# Patient Record
Sex: FEMALE | Race: OTHER
[De-identification: names, ages, dates, MRNs, and addresses within clinical notes are randomized per-mention and may not be internally consistent; named-entity substitution may affect disease eponyms.]

---

## 2018-02-22 ENCOUNTER — RESULT REVIEW (OUTPATIENT)
Age: 28
End: 2018-02-22

## 2018-04-12 ENCOUNTER — RESULT REVIEW (OUTPATIENT)
Age: 28
End: 2018-04-12

## 2018-10-11 ENCOUNTER — TRANSCRIPTION ENCOUNTER (OUTPATIENT)
Age: 28
End: 2018-10-11

## 2019-02-12 PROBLEM — Z00.00 ENCOUNTER FOR PREVENTIVE HEALTH EXAMINATION: Status: ACTIVE | Noted: 2019-02-12

## 2019-02-13 ENCOUNTER — RX RENEWAL (OUTPATIENT)
Age: 29
End: 2019-02-13

## 2019-02-13 DIAGNOSIS — Z30.9 ENCOUNTER FOR CONTRACEPTIVE MANAGEMENT, UNSPECIFIED: ICD-10-CM

## 2019-02-19 ENCOUNTER — RECORD ABSTRACTING (OUTPATIENT)
Age: 29
End: 2019-02-19

## 2019-02-19 DIAGNOSIS — Z82.49 FAMILY HISTORY OF ISCHEMIC HEART DISEASE AND OTHER DISEASES OF THE CIRCULATORY SYSTEM: ICD-10-CM

## 2019-02-19 DIAGNOSIS — G44.89 OTHER HEADACHE SYNDROME: ICD-10-CM

## 2019-02-19 DIAGNOSIS — Z78.9 OTHER SPECIFIED HEALTH STATUS: ICD-10-CM

## 2019-02-19 DIAGNOSIS — Z83.3 FAMILY HISTORY OF DIABETES MELLITUS: ICD-10-CM

## 2019-02-19 LAB — CYTOLOGY CVX/VAG DOC THIN PREP: NORMAL

## 2019-03-04 ENCOUNTER — APPOINTMENT (OUTPATIENT)
Dept: OBGYN | Facility: CLINIC | Age: 29
End: 2019-03-04

## 2019-03-13 ENCOUNTER — APPOINTMENT (OUTPATIENT)
Dept: OBGYN | Facility: CLINIC | Age: 29
End: 2019-03-13
Payer: COMMERCIAL

## 2019-03-13 VITALS
BODY MASS INDEX: 43.95 KG/M2 | HEIGHT: 68 IN | SYSTOLIC BLOOD PRESSURE: 122 MMHG | WEIGHT: 290 LBS | DIASTOLIC BLOOD PRESSURE: 76 MMHG

## 2019-03-13 LAB
BILIRUB UR QL STRIP: NORMAL
COLLECTION METHOD: NORMAL
GLUCOSE UR-MCNC: NORMAL
HCG UR QL: 0.2 EU/DL
HCG UR QL: NEGATIVE
HGB UR QL STRIP.AUTO: NORMAL
KETONES UR-MCNC: NORMAL
LEUKOCYTE ESTERASE UR QL STRIP: NORMAL
NITRITE UR QL STRIP: NORMAL
PH UR STRIP: 8.5
PROT UR STRIP-MCNC: ABNORMAL
QUALITY CONTROL: YES
SP GR UR STRIP: 1.02

## 2019-03-13 PROCEDURE — 81003 URINALYSIS AUTO W/O SCOPE: CPT | Mod: QW

## 2019-03-13 PROCEDURE — 99395 PREV VISIT EST AGE 18-39: CPT

## 2019-03-13 PROCEDURE — 81025 URINE PREGNANCY TEST: CPT

## 2019-03-13 NOTE — HISTORY OF PRESENT ILLNESS
[Last Pap ___] : Last cervical pap smear was [unfilled] [Sexually Active] : is sexually active [Monogamous] : is monogamous [Contraception] : uses contraception [Oral Contraceptives] : uses oral contraceptives [Menarche Age: ____] : age at menarche was [unfilled] [Definite:  ___ (Date)] : the last menstrual period was [unfilled] [Male ___] : [unfilled] male

## 2019-03-13 NOTE — COUNSELING
[Breast Self Exam] : breast self exam [Nutrition] : nutrition [Exercise] : exercise [Vitamins/Supplements] : vitamins/supplements [Contraception] : contraception [Sunscreen] : sunscreen [Safe Sexual Practices] : safe sexual practices [Weight Management] : weight management

## 2019-03-16 LAB
C TRACH RRNA SPEC QL NAA+PROBE: NOT DETECTED
N GONORRHOEA RRNA SPEC QL NAA+PROBE: NOT DETECTED
SOURCE TP AMPLIFICATION: NORMAL

## 2019-03-18 LAB — CYTOLOGY CVX/VAG DOC THIN PREP: NORMAL

## 2019-11-07 ENCOUNTER — TRANSCRIPTION ENCOUNTER (OUTPATIENT)
Age: 29
End: 2019-11-07

## 2020-02-05 ENCOUNTER — RX RENEWAL (OUTPATIENT)
Age: 30
End: 2020-02-05

## 2020-05-04 ENCOUNTER — RX RENEWAL (OUTPATIENT)
Age: 30
End: 2020-05-04

## 2020-10-28 ENCOUNTER — APPOINTMENT (OUTPATIENT)
Dept: OBGYN | Facility: CLINIC | Age: 30
End: 2020-10-28
Payer: COMMERCIAL

## 2020-10-28 VITALS
WEIGHT: 220 LBS | HEIGHT: 68 IN | DIASTOLIC BLOOD PRESSURE: 82 MMHG | BODY MASS INDEX: 33.34 KG/M2 | SYSTOLIC BLOOD PRESSURE: 124 MMHG

## 2020-10-28 DIAGNOSIS — Z85.850 PERSONAL HISTORY OF MALIGNANT NEOPLASM OF THYROID: ICD-10-CM

## 2020-10-28 DIAGNOSIS — Z86.39 PERSONAL HISTORY OF OTHER ENDOCRINE, NUTRITIONAL AND METABOLIC DISEASE: ICD-10-CM

## 2020-10-28 DIAGNOSIS — Z98.84 BARIATRIC SURGERY STATUS: ICD-10-CM

## 2020-10-28 DIAGNOSIS — Z01.419 ENCOUNTER FOR GYNECOLOGICAL EXAMINATION (GENERAL) (ROUTINE) W/OUT ABNORMAL FINDINGS: ICD-10-CM

## 2020-10-28 PROCEDURE — 99395 PREV VISIT EST AGE 18-39: CPT

## 2020-10-28 PROCEDURE — 99072 ADDL SUPL MATRL&STAF TM PHE: CPT

## 2020-10-28 RX ORDER — NORETHINDRONE ACETATE AND ETHINYL ESTRADIOL AND FERROUS FUMARATE 1.5-30(21)
1.5-3 KIT ORAL
Qty: 1 | Refills: 0 | Status: DISCONTINUED | COMMUNITY
Start: 2019-02-13 | End: 2020-10-28

## 2020-10-28 RX ORDER — NORETHINDRONE ACETATE AND ETHINYL ESTRADIOL 1MG-20(21)
1-20 KIT ORAL DAILY
Qty: 84 | Refills: 3 | Status: DISCONTINUED | COMMUNITY
Start: 2019-03-13 | End: 2020-10-28

## 2020-10-28 RX ORDER — NORETHINDRONE ACETATE AND ETHINYL ESTRADIOL AND FERROUS FUMARATE 1MG-20(21)
1-20 KIT ORAL DAILY
Qty: 1 | Refills: 0 | Status: DISCONTINUED | COMMUNITY
Start: 2020-02-05 | End: 2020-10-28

## 2020-10-28 RX ORDER — AMOXICILLIN AND CLAVULANATE POTASSIUM 875; 125 MG/1; MG/1
875-125 TABLET, COATED ORAL
Qty: 20 | Refills: 0 | Status: DISCONTINUED | COMMUNITY
Start: 2019-03-04 | End: 2020-10-28

## 2020-11-07 PROBLEM — Z86.39 HISTORY OF HASHIMOTO THYROIDITIS: Status: RESOLVED | Noted: 2020-11-07 | Resolved: 2020-11-07

## 2020-11-07 PROBLEM — Z98.84 H/O BARIATRIC SURGERY: Status: RESOLVED | Noted: 2020-11-07 | Resolved: 2020-11-07

## 2020-11-07 PROBLEM — Z85.850 HISTORY OF MALIGNANT NEOPLASM OF THYROID: Status: RESOLVED | Noted: 2020-11-07 | Resolved: 2020-11-07

## 2020-11-07 RX ORDER — LEVOTHYROXINE SODIUM 0.14 MG/1
137 TABLET ORAL
Refills: 0 | Status: ACTIVE | COMMUNITY

## 2020-11-07 NOTE — COUNSELING
[Nutrition/ Exercise/ Weight Management] : nutrition, exercise, weight management [Vitamins/Supplements] : vitamins/supplements [Sunscreen] : sunscreen [Breast Self Exam] : breast self exam [Contraception/ Emergency Contraception/ Safe Sexual Practices] : contraception, emergency contraception, safe sexual practices

## 2020-11-07 NOTE — HISTORY OF PRESENT ILLNESS
[Patient reported PAP Smear was normal] : Patient reported PAP Smear was normal [Menarche Age: ____] : age at menarche was [unfilled] [Men] : men [N] : Patient is not sexually active [Previously active] : previously active [TextBox_4] : Patient presents for annual gynecological exam\par \par Patient is status post gastric surgery 2019 with Dr. Patel and has lost 70 pounds and doing well\par \par She is status post thyroidectomy for thyroid cancer found in February 2020 and still being closely followed with TSH and thyroid levels\par \par She stopped oral contraceptives and has not restarted-she is not sexually active\par \par  [PapSmeardate] : 03/12/2019 [LMPDate] : 10/05/202 [TextBox_6] : 10/05/2020 [TextBox_9] : 12 [FreeTextEntry1] : 10/05/2020

## 2020-11-07 NOTE — PLAN
[FreeTextEntry1] : Patient prefers to stay off oral contraceptives at this time as she is under the care and closely followed for thyroid cancer and not presently sexually active

## 2020-11-24 LAB
C TRACH RRNA SPEC QL NAA+PROBE: NOT DETECTED
CYTOLOGY CVX/VAG DOC THIN PREP: NORMAL
N GONORRHOEA RRNA SPEC QL NAA+PROBE: NOT DETECTED
SOURCE TP AMPLIFICATION: NORMAL

## 2020-12-23 PROBLEM — Z01.419 ENCOUNTER FOR GYNECOLOGICAL EXAMINATION WITHOUT ABNORMAL FINDING: Status: RESOLVED | Noted: 2019-03-13 | Resolved: 2020-12-23

## 2021-01-04 ENCOUNTER — TRANSCRIPTION ENCOUNTER (OUTPATIENT)
Age: 31
End: 2021-01-04

## 2021-01-11 ENCOUNTER — APPOINTMENT (OUTPATIENT)
Dept: OBGYN | Facility: CLINIC | Age: 31
End: 2021-01-11
Payer: COMMERCIAL

## 2021-01-11 VITALS
DIASTOLIC BLOOD PRESSURE: 70 MMHG | HEIGHT: 67 IN | SYSTOLIC BLOOD PRESSURE: 112 MMHG | WEIGHT: 215 LBS | BODY MASS INDEX: 33.74 KG/M2 | TEMPERATURE: 97.2 F

## 2021-01-11 DIAGNOSIS — N90.7 VULVAR CYST: ICD-10-CM

## 2021-01-11 PROCEDURE — 99072 ADDL SUPL MATRL&STAF TM PHE: CPT

## 2021-01-11 PROCEDURE — 99213 OFFICE O/P EST LOW 20 MIN: CPT

## 2021-01-11 NOTE — HISTORY OF PRESENT ILLNESS
[N] : Patient denies prior pregnancies [Menarche Age: ____] : age at menarche was [unfilled] [Previously active] : previously active [No] : No [TextBox_4] : 31yo pt presents for possible in grown hair after waxing\par \par Pt denies pain or fever \par \par Pt is s/p bariatric sx with Dr. Patel\par \par Pt is s/p thyroidectomy for thyroid cancer found on w/u for bariatric sx\par \par She has a hx of keloid scarring- she tried inj of steroid in scar with derm but was too painful [PapSmeardate] : 10/28/2020  [ChlamydiaDate] : 10/28/2020  [TextBox_31] : WNL  [TextBox_68] : NEG  [LMPDate] : 12/16/2020  [TextBox_6] : 12/16/2020  [TextBox_9] : 13 [FreeTextEntry1] : 12/16/2020

## 2021-01-11 NOTE — PHYSICAL EXAM
[Appropriately responsive] : appropriately responsive [Alert] : alert [No Acute Distress] : no acute distress [FreeTextEntry1] : resolving 5mm sebaceous cyst on mons- no erythema no tenderness no heat no induration no sxs infection

## 2021-04-26 ENCOUNTER — APPOINTMENT (OUTPATIENT)
Dept: OBGYN | Facility: CLINIC | Age: 31
End: 2021-04-26

## 2023-02-10 ENCOUNTER — APPOINTMENT (OUTPATIENT)
Dept: ORTHOPEDIC SURGERY | Facility: CLINIC | Age: 33
End: 2023-02-10

## 2023-02-17 ENCOUNTER — APPOINTMENT (OUTPATIENT)
Dept: ORTHOPEDIC SURGERY | Facility: CLINIC | Age: 33
End: 2023-02-17
Payer: COMMERCIAL

## 2023-02-17 VITALS — BODY MASS INDEX: 34.86 KG/M2 | WEIGHT: 230 LBS | HEIGHT: 68 IN

## 2023-02-17 DIAGNOSIS — M76.51 PATELLAR TENDINITIS, RIGHT KNEE: ICD-10-CM

## 2023-02-17 DIAGNOSIS — M76.891 OTHER SPECIFIED ENTHESOPATHIES OF RIGHT LOWER LIMB, EXCLUDING FOOT: ICD-10-CM

## 2023-02-17 PROCEDURE — 73562 X-RAY EXAM OF KNEE 3: CPT | Mod: RT

## 2023-02-17 PROCEDURE — 99203 OFFICE O/P NEW LOW 30 MIN: CPT

## 2023-02-17 NOTE — IMAGING
[de-identified] : Right knee with minimal swelling, mild ttp anteriorly. Able to straight leg raise. Stable to varus/valgus stress. +TA, EHL, GA. SILT throughout. <2sec cap refill.\par \par Right knee radiographs with no fracture nor dislocation. Enthesophyte at quad and patellar tendon insertion at patella.

## 2023-02-17 NOTE — ASSESSMENT
[FreeTextEntry1] : Right knee quad/patellar tendonitis - reviewed radiographs and pathoanatomy with patient. Discussed management to consist of NSAIDs prn, PT, activity modification.\par \par F/u prn

## 2023-02-17 NOTE — HISTORY OF PRESENT ILLNESS
[de-identified] : 33F, PMHX of Thyroid Disease presents with right knee pain for approx 2 months. Reports noticed when getting up off the floor and pressure is applied. Admits to pain getting progressively worse. Denies numbness/tingling, Denies injury/trauma. Denies outside imaging/treatment. Denies knee bracing, denies OTC remedies, denies Ice/heat compress.

## 2023-06-17 ENCOUNTER — APPOINTMENT (OUTPATIENT)
Dept: OBGYN | Facility: CLINIC | Age: 33
End: 2023-06-17
Payer: MEDICAID

## 2023-06-17 ENCOUNTER — NON-APPOINTMENT (OUTPATIENT)
Age: 33
End: 2023-06-17

## 2023-06-17 VITALS
DIASTOLIC BLOOD PRESSURE: 66 MMHG | WEIGHT: 234.4 LBS | SYSTOLIC BLOOD PRESSURE: 100 MMHG | BODY MASS INDEX: 35.52 KG/M2 | HEIGHT: 68 IN

## 2023-06-17 DIAGNOSIS — Z01.419 ENCOUNTER FOR GYNECOLOGICAL EXAMINATION (GENERAL) (ROUTINE) W/OUT ABNORMAL FINDINGS: ICD-10-CM

## 2023-06-17 DIAGNOSIS — Z11.3 ENCOUNTER FOR SCREENING FOR INFECTIONS WITH A PREDOMINANTLY SEXUAL MODE OF TRANSMISSION: ICD-10-CM

## 2023-06-17 DIAGNOSIS — Z30.09 ENCOUNTER FOR OTHER GENERAL COUNSELING AND ADVICE ON CONTRACEPTION: ICD-10-CM

## 2023-06-17 DIAGNOSIS — Z12.4 ENCOUNTER FOR SCREENING FOR MALIGNANT NEOPLASM OF CERVIX: ICD-10-CM

## 2023-06-17 LAB
HCG UR QL: NEGATIVE
QUALITY CONTROL: YES

## 2023-06-17 PROCEDURE — 99395 PREV VISIT EST AGE 18-39: CPT

## 2023-06-17 PROCEDURE — 81025 URINE PREGNANCY TEST: CPT

## 2023-06-17 NOTE — HISTORY OF PRESENT ILLNESS
[N] : Patient denies prior pregnancies [Menarche Age: ____] : age at menarche was [unfilled] [LMP unknown] : LMP unknown [Y] : Patient is sexually active [unknown] : Patient is unsure of the date of her LMP [Currently Active] : currently active [FreeTextEntry1] : Pt presents for annual with complaints of vaginal itching  since starting ozempic-\par She denies any abnormal vaginal bleeding\par Hx thyroidectomy r/t cancer of the thyroid found on w/u for bariatric surgery\par \par Requesting STD screening cultures- declines lab work\par SA- has 2 partners- safe sex reviewed [PapSmeardate] : 10/28/20 [TextBox_31] : NEG [GonorrheaDate] : 10/28/20 [TextBox_63] : NEG [ChlamydiaDate] : 10/28/20 [TextBox_68] : NEG [PGHxTotal] : 0

## 2023-06-17 NOTE — PLAN
[FreeTextEntry1] : Treat vaginal itching with fluconazole- instructions rev w pt\par Call for any increasing or persisting sxs\par Hygiene discussed\par fu annually GYN

## 2023-06-24 LAB
C TRACH RRNA SPEC QL NAA+PROBE: NOT DETECTED
CANDIDA VAG CYTO: NOT DETECTED
CYTOLOGY CVX/VAG DOC THIN PREP: NORMAL
G VAGINALIS+PREV SP MTYP VAG QL MICRO: DETECTED
HPV HIGH+LOW RISK DNA PNL CVX: NOT DETECTED
N GONORRHOEA RRNA SPEC QL NAA+PROBE: NOT DETECTED
SOURCE TP AMPLIFICATION: NORMAL
T VAGINALIS VAG QL WET PREP: NOT DETECTED

## 2023-08-05 ENCOUNTER — APPOINTMENT (OUTPATIENT)
Dept: OBGYN | Facility: CLINIC | Age: 33
End: 2023-08-05
Payer: MEDICAID

## 2023-08-05 VITALS
BODY MASS INDEX: 35.46 KG/M2 | SYSTOLIC BLOOD PRESSURE: 100 MMHG | DIASTOLIC BLOOD PRESSURE: 60 MMHG | WEIGHT: 234 LBS | HEIGHT: 68 IN

## 2023-08-05 PROCEDURE — 99213 OFFICE O/P EST LOW 20 MIN: CPT

## 2023-08-05 NOTE — HISTORY OF PRESENT ILLNESS
[Y] : Patient is sexually active [N] : Patient denies prior pregnancies [Menarche Age: ____] : age at menarche was [unfilled] [Currently Active] : currently active [PapSmeardate] : 6/17/2023 [TextBox_31] : WNL [GonorrheaDate] : 6/17/2023 [TextBox_63] : NEG [ChlamydiaDate] : 6/17/2023 [TextBox_68] : NEG [HPVDate] : 6/17/2023 [TextBox_78] : NEG [LMPDate] : 7/21/2023 [FreeTextEntry1] : 7/21/2023

## 2023-08-05 NOTE — PLAN
[FreeTextEntry1] : -Benign exam; will treat culture as indicated -Discussed PO Metronidazole if +BV instead of PV route and evaluate for better symptom management  -RTO 1-2 weeks for TVUS, possibly labs pending imaging to evaluate irregular menses  -Call or RTO PRN for any new problems, questions or concerns

## 2023-08-05 NOTE — PHYSICAL EXAM
[Chaperone Present] : A chaperone was present in the examining room during all aspects of the physical examination [FreeTextEntry1] : TIARA Maria [Appropriately responsive] : appropriately responsive [Alert] : alert [No Acute Distress] : no acute distress [Oriented x3] : oriented x3 [Labia Majora] : normal [Labia Minora] : normal [Normal] : normal [Tenderness] : nontender [Uterine Adnexae] : non-palpable

## 2023-08-07 ENCOUNTER — NON-APPOINTMENT (OUTPATIENT)
Age: 33
End: 2023-08-07

## 2023-08-09 LAB
CANDIDA VAG CYTO: NOT DETECTED
G VAGINALIS+PREV SP MTYP VAG QL MICRO: DETECTED
T VAGINALIS VAG QL WET PREP: NOT DETECTED

## 2023-08-12 ENCOUNTER — ASOB RESULT (OUTPATIENT)
Age: 33
End: 2023-08-12

## 2023-08-12 ENCOUNTER — APPOINTMENT (OUTPATIENT)
Dept: ANTEPARTUM | Facility: CLINIC | Age: 33
End: 2023-08-12
Payer: MEDICAID

## 2023-08-12 ENCOUNTER — APPOINTMENT (OUTPATIENT)
Dept: OBGYN | Facility: CLINIC | Age: 33
End: 2023-08-12
Payer: MEDICAID

## 2023-08-12 VITALS
BODY MASS INDEX: 35.46 KG/M2 | WEIGHT: 234 LBS | HEIGHT: 68 IN | DIASTOLIC BLOOD PRESSURE: 70 MMHG | SYSTOLIC BLOOD PRESSURE: 110 MMHG

## 2023-08-12 DIAGNOSIS — R45.86 EMOTIONAL LABILITY: ICD-10-CM

## 2023-08-12 DIAGNOSIS — N94.3 PREMENSTRUAL TENSION SYNDROME: ICD-10-CM

## 2023-08-12 DIAGNOSIS — N92.6 IRREGULAR MENSTRUATION, UNSPECIFIED: ICD-10-CM

## 2023-08-12 PROCEDURE — 76856 US EXAM PELVIC COMPLETE: CPT | Mod: 59

## 2023-08-12 PROCEDURE — 99214 OFFICE O/P EST MOD 30 MIN: CPT | Mod: 25

## 2023-08-12 PROCEDURE — 76830 TRANSVAGINAL US NON-OB: CPT

## 2023-08-12 NOTE — HISTORY OF PRESENT ILLNESS
[Y] : Patient is sexually active [N] : Patient denies prior pregnancies [Menarche Age: ____] : age at menarche was [unfilled] [Currently Active] : currently active [TextBox_4] : Gen is here for results of her pelvic US which was completed for evaluation of irregular menses. Her LMP consisted of spotting only. Her cycles are unpredictable and coupled with severe mood symptoms.  At baseline she is irritable, but it is heightened before she has her period.    she has a history of gastric surgery and lost 70+ lbs.  She was diagnosed with thyroid cancer soon after and since that time feels as if her mood is erratic (but better than immediately after surgery).  Her sonogram shows an 8mm endometrium which appears slightly cystic.  She is also noted to have a simple 2.5cm left ovarian cyst.  No free fluid or right ovarian cysts noted. [PapSmeardate] : 6/17/2023 [TextBox_31] : WNL [GonorrheaDate] : 6/17/2023 [TextBox_63] : NEG [ChlamydiaDate] : 6/17/2023 [TextBox_68] : NEG [HPVDate] : 6/17/2023 [LMPDate] : 7/21/2023 [TextBox_78] : NEG [FreeTextEntry1] : 7/21/2023

## 2023-08-12 NOTE — PLAN
[FreeTextEntry1] : Irregular bleeding - Differentials discussed- including structural, hormonal, or potentially malignant causes - Due to irregular pattern and history of obesity, she was recommended to consult with the physician regarding the need for hysteroscopy and an endometrial biopsy.  Pt is interested in at least having the EMB completed because she is fearful of cancer given her thyroid cancer history. - Labs obtained, do not suspect PCOS, but will make sure hormone levels are wnl.  Moods - Remedies including hormonal and non hormonal reviewed - encouraged healthy behaviors ie exercise, healthy eating, reducing stress, herbal remedies - continue with psychotherapy - consider seeing a psychiatrist. Her symptoms are beyond just PMS and is having them throughout the month- discussed daily medication vs cyclic SSRI for severe PMS symptoms. She would probably benefit from daily medication.  Consider wellbutrin given potentially less weight gain side effects- discuss with psych.   - declined hormonal use now, fearful of hormone effect on mood and being able to differentiate thyroid symptoms

## 2023-08-14 LAB
DHEA-S SERPL-MCNC: 232 UG/DL
FSH SERPL-MCNC: 2.8 IU/L
PROLACTIN SERPL-MCNC: 14 NG/ML

## 2023-08-16 LAB
TESTOST FREE SERPL-MCNC: 2.6 PG/ML
TESTOST SERPL-MCNC: 38.6 NG/DL

## 2023-09-05 ENCOUNTER — APPOINTMENT (OUTPATIENT)
Dept: OBGYN | Facility: CLINIC | Age: 33
End: 2023-09-05
Payer: MEDICAID

## 2023-09-05 VITALS
WEIGHT: 229.13 LBS | BODY MASS INDEX: 34.73 KG/M2 | HEIGHT: 68 IN | DIASTOLIC BLOOD PRESSURE: 58 MMHG | SYSTOLIC BLOOD PRESSURE: 100 MMHG

## 2023-09-05 DIAGNOSIS — Z30.09 ENCOUNTER FOR OTHER GENERAL COUNSELING AND ADVICE ON CONTRACEPTION: ICD-10-CM

## 2023-09-05 DIAGNOSIS — R93.89 ABNORMAL FINDINGS ON DIAGNOSTIC IMAGING OF OTHER SPECIFIED BODY STRUCTURES: ICD-10-CM

## 2023-09-05 DIAGNOSIS — N93.9 ABNORMAL UTERINE AND VAGINAL BLEEDING, UNSPECIFIED: ICD-10-CM

## 2023-09-05 LAB
HCG UR QL: NEGATIVE
QUALITY CONTROL: YES

## 2023-09-05 PROCEDURE — 81025 URINE PREGNANCY TEST: CPT

## 2023-09-05 PROCEDURE — 58100 BIOPSY OF UTERUS LINING: CPT

## 2023-09-05 PROCEDURE — 99214 OFFICE O/P EST MOD 30 MIN: CPT | Mod: 25

## 2023-09-06 RX ORDER — MEDROXYPROGESTERONE ACETATE 5 MG/1
5 TABLET ORAL
Qty: 10 | Refills: 3 | Status: ACTIVE | COMMUNITY
Start: 2023-09-05

## 2023-09-07 PROBLEM — Z30.09 BIRTH CONTROL COUNSELING: Status: ACTIVE | Noted: 2023-09-07

## 2023-09-07 NOTE — PHYSICAL EXAM
[Chaperone Present] : A chaperone was present in the examining room during all aspects of the physical examination [FreeTextEntry1] : kehinde [Appropriately responsive] : appropriately responsive [Alert] : alert [No Acute Distress] : no acute distress [Oriented x3] : oriented x3 [Labia Majora] : normal [Labia Minora] : normal [No Bleeding] : There was no active vaginal bleeding [Cervical Stenosis] : cervical stenosis [Normal] : normal [Uterine Adnexae] : non-palpable

## 2023-09-07 NOTE — DISCUSSION/SUMMARY
[FreeTextEntry1] : We reviewed the sonogram which showed 8mm lining which was heterogeneous. Based on her age, medical history, and bleeding pattern it is warranted to sample the lining to rule out hyperplasia and malignancy. I explained that based on the sono, endometrial sampling with EMB is sufficient and as the hysteroscopy procedure is painful, I did not advise proceeding with hysteroscopy unless EMB is non diagnostic. This was performed today without issues.  I also advised provera withdrawal bleed to give her a true menstrual shedding, and then we can recheck the sonogram to confirm a more normal endometrial appearance. Rx was issued with instructions for correct use. RTO in 4-6 weeks for repeat sonogram.  We also discussed the benefits of using OCP or mirena IUD for both contraception and to prevent prolonged amenorrhea. She is not sure if she wishes to proceed with either method but we will discuss further at the next visit.

## 2023-09-07 NOTE — PROCEDURE
[Endometrial Biopsy] : Endometrial biopsy [Consent Obtained] : Consent obtained [Thickened Endometrium] : thickened endometrium [Negative] : negative pregnancy test [Betadine] : Betadine [Tenaculum] : Tenaculum [Required Dilation] : required dilation [Sounded to ___ cm] : sounded to [unfilled] ~Ucm [Retroverted] : retroverted [Scant] : scant [Specimen Collected] : collected [Sent to Pathology] : placed in buffered formalin and sent for pathology [Tolerated Well] : Patient tolerated the procedure well [No Complications] : No complications [Pain] : pain [Irregular Bleeding] : irregular uterine bleeding [Patient] : patient [None] : none [de-identified] : heterogeneous endometrium  [LMPDate] : 7/21/23 [de-identified] : endometrium  [de-identified] : 3mm pipelle  [de-identified] : pt reported cramping during procedure

## 2023-09-07 NOTE — HISTORY OF PRESENT ILLNESS
[FreeTextEntry1] : Gen presents for spotting and heterogeneous endometrium on recent sonogram, lining was 8mm. The sonogram was performed for irregular menses. She states that she does not have true menses but rather irregular light spotting.  She had thyroid cancer with total thyroidectomy 4 years ago, and reports normal TSH on levothyroxine currently. She is requesting endometrial sampling today and was originally scheduled for hysteroscopy.

## 2023-09-25 LAB — CORE LAB BIOPSY: NORMAL

## 2023-10-03 ENCOUNTER — APPOINTMENT (OUTPATIENT)
Dept: OBGYN | Facility: CLINIC | Age: 33
End: 2023-10-03

## 2023-10-13 ENCOUNTER — EMERGENCY (EMERGENCY)
Facility: HOSPITAL | Age: 33
LOS: 1 days | Discharge: HOME | End: 2023-10-13
Attending: EMERGENCY MEDICINE
Payer: MEDICAID

## 2023-10-13 VITALS
SYSTOLIC BLOOD PRESSURE: 135 MMHG | OXYGEN SATURATION: 99 % | HEIGHT: 68 IN | RESPIRATION RATE: 18 BRPM | TEMPERATURE: 98 F | WEIGHT: 225.09 LBS | DIASTOLIC BLOOD PRESSURE: 81 MMHG | HEART RATE: 76 BPM

## 2023-10-13 VITALS
OXYGEN SATURATION: 97 % | RESPIRATION RATE: 20 BRPM | HEART RATE: 65 BPM | DIASTOLIC BLOOD PRESSURE: 76 MMHG | SYSTOLIC BLOOD PRESSURE: 109 MMHG

## 2023-10-13 LAB
ALBUMIN SERPL ELPH-MCNC: 3.7 G/DL — SIGNIFICANT CHANGE UP (ref 3.3–5.2)
ALP SERPL-CCNC: 75 U/L — SIGNIFICANT CHANGE UP (ref 40–120)
ALT FLD-CCNC: 15 U/L — SIGNIFICANT CHANGE UP
ANION GAP SERPL CALC-SCNC: 12 MMOL/L — SIGNIFICANT CHANGE UP (ref 5–17)
AST SERPL-CCNC: 21 U/L — SIGNIFICANT CHANGE UP
BASOPHILS # BLD AUTO: 0.07 K/UL — SIGNIFICANT CHANGE UP (ref 0–0.2)
BASOPHILS NFR BLD AUTO: 0.7 % — SIGNIFICANT CHANGE UP (ref 0–2)
BILIRUB SERPL-MCNC: 0.5 MG/DL — SIGNIFICANT CHANGE UP (ref 0.4–2)
BUN SERPL-MCNC: 7.3 MG/DL — LOW (ref 8–20)
CA-I BLD-SCNC: 1.03 MMOL/L — LOW (ref 1.15–1.33)
CALCIUM SERPL-MCNC: 7.8 MG/DL — LOW (ref 8.4–10.5)
CHLORIDE SERPL-SCNC: 102 MMOL/L — SIGNIFICANT CHANGE UP (ref 96–108)
CO2 SERPL-SCNC: 24 MMOL/L — SIGNIFICANT CHANGE UP (ref 22–29)
CREAT SERPL-MCNC: 0.63 MG/DL — SIGNIFICANT CHANGE UP (ref 0.5–1.3)
EGFR: 121 ML/MIN/1.73M2 — SIGNIFICANT CHANGE UP
EOSINOPHIL # BLD AUTO: 0.16 K/UL — SIGNIFICANT CHANGE UP (ref 0–0.5)
EOSINOPHIL NFR BLD AUTO: 1.6 % — SIGNIFICANT CHANGE UP (ref 0–6)
GLUCOSE SERPL-MCNC: 100 MG/DL — HIGH (ref 70–99)
HCT VFR BLD CALC: 36.8 % — SIGNIFICANT CHANGE UP (ref 34.5–45)
HGB BLD-MCNC: 12.7 G/DL — SIGNIFICANT CHANGE UP (ref 11.5–15.5)
IMM GRANULOCYTES NFR BLD AUTO: 0.4 % — SIGNIFICANT CHANGE UP (ref 0–0.9)
LYMPHOCYTES # BLD AUTO: 1.97 K/UL — SIGNIFICANT CHANGE UP (ref 1–3.3)
LYMPHOCYTES # BLD AUTO: 19.4 % — SIGNIFICANT CHANGE UP (ref 13–44)
MCHC RBC-ENTMCNC: 30.3 PG — SIGNIFICANT CHANGE UP (ref 27–34)
MCHC RBC-ENTMCNC: 34.5 GM/DL — SIGNIFICANT CHANGE UP (ref 32–36)
MCV RBC AUTO: 87.8 FL — SIGNIFICANT CHANGE UP (ref 80–100)
MONOCYTES # BLD AUTO: 0.62 K/UL — SIGNIFICANT CHANGE UP (ref 0–0.9)
MONOCYTES NFR BLD AUTO: 6.1 % — SIGNIFICANT CHANGE UP (ref 2–14)
NEUTROPHILS # BLD AUTO: 7.28 K/UL — SIGNIFICANT CHANGE UP (ref 1.8–7.4)
NEUTROPHILS NFR BLD AUTO: 71.8 % — SIGNIFICANT CHANGE UP (ref 43–77)
PLATELET # BLD AUTO: 270 K/UL — SIGNIFICANT CHANGE UP (ref 150–400)
POTASSIUM SERPL-MCNC: 3.6 MMOL/L — SIGNIFICANT CHANGE UP (ref 3.5–5.3)
POTASSIUM SERPL-SCNC: 3.6 MMOL/L — SIGNIFICANT CHANGE UP (ref 3.5–5.3)
PROT SERPL-MCNC: 7.1 G/DL — SIGNIFICANT CHANGE UP (ref 6.6–8.7)
RBC # BLD: 4.19 M/UL — SIGNIFICANT CHANGE UP (ref 3.8–5.2)
RBC # FLD: 12.3 % — SIGNIFICANT CHANGE UP (ref 10.3–14.5)
SODIUM SERPL-SCNC: 138 MMOL/L — SIGNIFICANT CHANGE UP (ref 135–145)
TSH SERPL-MCNC: <0.1 UIU/ML — LOW (ref 0.27–4.2)
WBC # BLD: 10.14 K/UL — SIGNIFICANT CHANGE UP (ref 3.8–10.5)
WBC # FLD AUTO: 10.14 K/UL — SIGNIFICANT CHANGE UP (ref 3.8–10.5)

## 2023-10-13 PROCEDURE — 99284 EMERGENCY DEPT VISIT MOD MDM: CPT

## 2023-10-13 PROCEDURE — 93010 ELECTROCARDIOGRAM REPORT: CPT

## 2023-10-13 RX ORDER — CALCIUM GLUCONATE 100 MG/ML
2 VIAL (ML) INTRAVENOUS ONCE
Refills: 0 | Status: COMPLETED | OUTPATIENT
Start: 2023-10-13 | End: 2023-10-13

## 2023-10-13 RX ORDER — CALCIUM GLUCONATE 100 MG/ML
1 VIAL (ML) INTRAVENOUS ONCE
Refills: 0 | Status: DISCONTINUED | OUTPATIENT
Start: 2023-10-13 | End: 2023-10-13

## 2023-10-13 RX ADMIN — Medication 200 GRAM(S): at 19:28

## 2023-10-13 RX ADMIN — Medication 1 TABLET(S): at 21:32

## 2023-10-13 NOTE — ED ADULT TRIAGE NOTE - CHIEF COMPLAINT QUOTE
Pt ambulatory in triage, stated she was driving when she developed tingling in both hands and felt like she was going to pass out. Pt stated felt similar to when she had hypocalcemia s/p thyroidectomy.

## 2023-10-13 NOTE — ED ADULT NURSE NOTE - NSFALLUNIVINTERV_ED_ALL_ED
Bed/Stretcher in lowest position, wheels locked, appropriate side rails in place/Call bell, personal items and telephone in reach/Instruct patient to call for assistance before getting out of bed/chair/stretcher/Non-slip footwear applied when patient is off stretcher/[place] to call system/Physically safe environment - no spills, clutter or unnecessary equipment/Purposeful proactive rounding/Room/bathroom lighting operational, light cord in reach

## 2023-10-13 NOTE — ED PROVIDER NOTE - NSFOLLOWUPINSTRUCTIONS_ED_ALL_ED_FT
- Take medication as directed    - Follow up with endocrine    Hypocalcemia, Adult    Hypocalcemia is when the level of calcium in a person's blood is below normal. Calcium is a mineral that is used by the body in many ways. Not having enough blood calcium can affect the nervous system. This can lead to problems with muscles, the heart, and the brain.    What are the causes?  This condition may be caused by:    A deficiency of vitamin D or magnesium or both.  Decreased levels of parathyroid hormone (hypoparathyroidism).  Kidney function problems.  Low levels of a body protein called albumin.  Inflammation of the pancreas (pancreatitis).  Not taking in enough vitamins and minerals in the diet or having intestinal problems that interfere with nutrient absorption.  Certain medicines.    What are the signs or symptoms?  Some people may not have any symptoms, especially if they have long-term (chronic) hypocalcemia.    Symptoms of this condition may include:    Numbness and tingling in the fingers, toes, or around the mouth.  Muscle twitching, aches, or cramps, especially in the legs, feet, and back.  Spasm of the voice box (laryngospasm). This may make it difficult to breath or speak.  Fast heartbeats (palpitations) and abnormal heart rhythms (arrhythmias).  Shaking uncontrollably (seizures).  Memory problems, confusion, or difficulty thinking.  Depression, anxiety, irritability, or changes in personality.    Long-term symptoms of this condition may include:    Coarse, brittle hair and nails.  Dry skin or lasting skin diseases (psoriasis, eczema, or dermatitis).  Dental cavities.  Clouding of the eye lens (cataracts).    How is this diagnosed?     This condition is usually diagnosed with a blood test. You may also have other tests to help determine the underlying cause of the condition. This may include more blood tests and imaging tests.    How is this treated?  This condition may be treated with:    Calcium given by mouth (orally) or given through an IV. The method used for giving calcium will depend on the severity of the condition. If your condition is severe, you may need to be closely monitored in the hospital.  Giving other minerals (electrolytes), such as magnesium.    Other treatment will depend on the cause of the condition.    Follow these instructions at home:  Follow diet instructions from your health care provider or dietitian.  Take supplements only as told by your health care provider.  Keep all follow-up visits as told by your health care provider. This is important.    Contact a health care provider if you:  Have increased muscle twitching or cramps.  Have new swelling in the feet, ankles, or legs.  Develop changes in mood, memory, or personality.    Get help right away if you:  Have chest pain.  Have persistent rapid or irregular heartbeats.  Have difficulty breathing.  Faint.  Start to have seizures.  Have confusion.    Summary  Hypocalcemia is when the level of calcium in a person's blood is below normal. Not having enough blood calcium can affect the nervous system. This can lead to problems with muscles, the heart, and the brain.  This condition may be treated with calcium given by mouth or through an IV, taking other minerals, and treating the underlying cause of hypocalcemia.  Take supplements only as told by your health care provider.  Contact a health care provider if you have new or worsening symptoms.  Keep all follow-up visits as told by your health care provider. This is important.    ADDITIONAL NOTES AND INSTRUCTIONS    Please follow up with your Primary MD in 24-48 hr.  Seek immediate medical care for any new/worsening signs or symptoms.

## 2023-10-13 NOTE — ED PROVIDER NOTE - CLINICAL SUMMARY MEDICAL DECISION MAKING FREE TEXT BOX
33-year-old female with history of hypothyroidism status post thyroidectomy, anxiety, status post gastric sleeve, hypocalcemia presents to ED complaining of tingling sensation and cramping of the arms and legs.    Patient with hypocalcemia 7.8 with ionized calcium of 1.03.  Symptomatic with cramping and numbness and tingling sensation.  Will give calcium gluconate 2 g to relieve symptoms.   EKG with no prolonged QT.  Patient no seizures at this time. We will repeat calcium in 4 to 6 hours after IV.  Patient with TSH less than 0.01.  Patient notes she follows up with endocrinologist and will contact them immediately for evaluation.  Patient notes that she follows up with endocrinologist frequently and she monitors her thyroid level quite often and she can follow-up with them immediately. 33-year-old female with history of hypothyroidism status post thyroidectomy, anxiety, status post gastric sleeve, hypocalcemia presents to ED complaining of tingling sensation and cramping of the arms and legs.    Patient with hypocalcemia 7.8 with ionized calcium of 1.03.  Symptomatic with cramping and numbness and tingling sensation.  Will give calcium gluconate 2 g to relieve symptoms.   EKG with no prolonged QT.  Patient no seizures at this time. We will repeat calcium in 4 to 6 hours after IV.  Patient with TSH less than 0.01.  Patient notes she follows up with endocrinologist and will contact them immediately for evaluation.  Patient notes that she follows up with endocrinologist frequently and she monitors her thyroid level quite often and she can follow-up with them immediately.    Pt reassessed, pt feeling better at this time, vss, pt able to walk, talk and vocalized plan of action. Discussed in depth and explained to pt in depth the next steps that need to be taken including proper follow up with PCP or specialists. All incidental findings were discussed with pt as well. Pt verbalized their concerns and all questions were answered. Pt understands dispo and wants discharge. Given good instructions when to return to ED, strict return precautions and importance of f/u.

## 2023-10-13 NOTE — ED PROVIDER NOTE - NS ED ATTENDING STATEMENT MOD
This was a shared visit with the [facility]. I reviewed and verified the documentation and independently performed the documented:

## 2023-10-13 NOTE — ED PROVIDER NOTE - OBJECTIVE STATEMENT
33-year-old female with history of hypothyroidism status post thyroidectomy, anxiety, status post gastric sleeve, hypocalcemia presents to ED complaining of tingling sensation and cramping of the arms and legs.  Patient notes that she was out with her friend started having a weird sensation upper lip.  Admits that she had twitching of the right eye.  Patient then notes that her friend told her that she had the symptoms and on her drive home she felt cramping and tingling and "numbness"  of her bilateral upper extremities.  Patient notes she had similar symptoms when she had hypocalcemia in the past.  Patient notes that the parathyroids  were left in but notes that they were in "shock" which caused her hypocalcemia in the past.  Patient notes numbness and tingling sensation has since subsided but still feeling cramping.  Denies chest pain shortness of breath blurry vision loss of vision.

## 2023-10-13 NOTE — ED ADULT NURSE REASSESSMENT NOTE - NS ED NURSE REASSESS COMMENT FT1
Pt is resting in stretcher comfortably at this time, no apparent distress noted at this time. Pt safety maintained. Pt denies any complaints at this time. Pending repeat labs. Pt made aware of plan of care and verbalized understanding.

## 2023-10-13 NOTE — ED PROVIDER NOTE - ATTENDING APP SHARED VISIT CONTRIBUTION OF CARE
pt with numbness to hands while driving  similar episode in past  hx total thyroidectomy, hypocalcemia  agree w pe  agree w labs meds ekg  agree w iv Calcium replacement  agree w evaluation and mngt

## 2023-10-13 NOTE — ED PROVIDER NOTE - PHYSICAL EXAMINATION
General-alert and oriented to person place and time, nontoxic appearing, pleasant cooperative, NAD  HEENT-normocephalic, atraumatic, NT to palp, EOMI, PERRLA, no conjunctival injections, nares patent, pinna nt to palp, tympanic membrane intact bilaterally, nonbulging TM, no erythema noted, +light reflex, moist oral mucosa, tongue nonenlarged, uvula midline, tonsils nonenlarged, no exudates or erythema noted  Neck- supple, trach midline, No JVD, no LAD  Chest- Nt to palp, no reproducible pain  Cardio-s1,s2 present, regular rate and rhythm  Resp- talks in full sentences, symmetrical chest rise, CTA bilat, no evidence of wheezes, rhonchi noted  Abdomen- bowel sounds presnt in all 4 quadrants, soft, NT/ND, no guarding, no rebound tenderness  MSK- moves all extremities, able to ambulate without issues  Back- nt to palp of cervical, thoracic, lumbar spine, nt to palp of paraspinal m., No CVA tenderness  Neuro- no focal deficits, sensation intact, CN II-XII intact, neg chvostek

## 2023-10-13 NOTE — ED ADULT NURSE NOTE - OBJECTIVE STATEMENT
Pt received A&Ox4 c/o b/l numbness/weakness to hands. Pt states she was driving when sensation began. Hx of thyroidectomy and hypocalcemia. Pt states has not felt like this since she was hypocalcemic x 3 yrs ago. Facial symmetry present. Denies any HA, blurred vision, nvd. Respirations even & unlabored. NAD. Pt made aware of plan of care and verbalized understanding.

## 2023-10-13 NOTE — ED PROVIDER NOTE - PATIENT PORTAL LINK FT
You can access the FollowMyHealth Patient Portal offered by [facility] by registering at the following website: [email or web address]. By joining [employer]’s FollowMyHealth portal, you will also be able to view your health information using other applications (apps) compatible with our system.

## 2023-10-14 LAB
ALBUMIN SERPL ELPH-MCNC: 3.6 G/DL — SIGNIFICANT CHANGE UP (ref 3.3–5.2)
ALP SERPL-CCNC: 70 U/L — SIGNIFICANT CHANGE UP (ref 40–120)
ALT FLD-CCNC: 12 U/L — SIGNIFICANT CHANGE UP
ANION GAP SERPL CALC-SCNC: 11 MMOL/L — SIGNIFICANT CHANGE UP (ref 5–17)
AST SERPL-CCNC: 14 U/L — SIGNIFICANT CHANGE UP
BILIRUB SERPL-MCNC: 0.5 MG/DL — SIGNIFICANT CHANGE UP (ref 0.4–2)
BUN SERPL-MCNC: 7.6 MG/DL — LOW (ref 8–20)
CA-I BLD-SCNC: 1.06 MMOL/L — LOW (ref 1.15–1.33)
CALCIUM SERPL-MCNC: 8.1 MG/DL — LOW (ref 8.4–10.5)
CHLORIDE SERPL-SCNC: 104 MMOL/L — SIGNIFICANT CHANGE UP (ref 96–108)
CO2 SERPL-SCNC: 25 MMOL/L — SIGNIFICANT CHANGE UP (ref 22–29)
CREAT SERPL-MCNC: 0.63 MG/DL — SIGNIFICANT CHANGE UP (ref 0.5–1.3)
EGFR: 121 ML/MIN/1.73M2 — SIGNIFICANT CHANGE UP
GLUCOSE SERPL-MCNC: 76 MG/DL — SIGNIFICANT CHANGE UP (ref 70–99)
POTASSIUM SERPL-MCNC: 3.6 MMOL/L — SIGNIFICANT CHANGE UP (ref 3.5–5.3)
POTASSIUM SERPL-SCNC: 3.6 MMOL/L — SIGNIFICANT CHANGE UP (ref 3.5–5.3)
PROT SERPL-MCNC: 6.7 G/DL — SIGNIFICANT CHANGE UP (ref 6.6–8.7)
SODIUM SERPL-SCNC: 140 MMOL/L — SIGNIFICANT CHANGE UP (ref 135–145)

## 2023-10-14 PROCEDURE — 82330 ASSAY OF CALCIUM: CPT

## 2023-10-14 PROCEDURE — 36415 COLL VENOUS BLD VENIPUNCTURE: CPT

## 2023-10-14 PROCEDURE — 85025 COMPLETE CBC W/AUTO DIFF WBC: CPT

## 2023-10-14 PROCEDURE — 96374 THER/PROPH/DIAG INJ IV PUSH: CPT

## 2023-10-14 PROCEDURE — 93005 ELECTROCARDIOGRAM TRACING: CPT

## 2023-10-14 PROCEDURE — 84443 ASSAY THYROID STIM HORMONE: CPT

## 2023-10-14 PROCEDURE — 99284 EMERGENCY DEPT VISIT MOD MDM: CPT | Mod: 25

## 2023-10-14 PROCEDURE — 83735 ASSAY OF MAGNESIUM: CPT

## 2023-10-14 PROCEDURE — 80053 COMPREHEN METABOLIC PANEL: CPT

## 2023-10-14 RX ORDER — CALCIUM CARBONATE 500(1250)
1 TABLET ORAL
Qty: 14 | Refills: 0
Start: 2023-10-14 | End: 2023-10-27

## 2023-10-24 NOTE — ED PROVIDER NOTE - PATIENT'S PREFERRED PRONOUN
TITLE: At Home Chronic Pain Management: An [facility] Study Invitation    Dear Ms.Ishaan,     I am writing to share some information about a clinical trial that seeks to improve chronic pain management without the need to change your current pain treatments or take experimental drugs.      This study is entirely remote, without any need to visit the hospital, clinic, or any doctors in person; all equipment is shipped at no cost to you. I am contacting you with permission from [facility].     As the Director of Health Services research at [facility], I invite you to consider participating in this program for the opportunity to try a non-invasive, digital therapeutic for chronic pain.     You will be eligible for up to $300 in [employer] e-cards. The program spans 8 weeks, and if you decide to participate, you will be asked to use a digital program and complete brief, weekly surveys.     We are collaborating with colleagues at [facility] and [facility] to overcome barriers to pain care. We want to understand the effectiveness of evidence-based, on-demand, digital behavioral treatments for chronic pain--delivered in the comfort of your home.     In addition to the medical care provided, we conduct research to learn how to better prevent, diagnose, and treat illness with the goal of improving health.     Your choice to participate is a personal decision and will have no effect on your care. In fact, participation is completely voluntary, and you can choose to withdraw from the study at any time and for any reason.     For more detailed information about this study, If you have any questions, or would like to decline participation, please reply or contact the study team at [email or web address].Our research team will follow-up with you shortly to discuss your potential participation.     Thank you for considering joining us in this clinical trial. We look forward to collaborating with you to improve the  management of your chronic pain.       Sincerely,   Juan Caballero MD, [facility]      Professor of Medicine and Public Health   Director of Health Services Research, [facility]   Director, Master's Degree Program in Health Delivery Science   [facility] , [facility] [employer] [place] ([facility])   , [facility] [employer] [facility] [place]   Co--in-Chief, [employer] Journal of Gastroenterology   Department of Medicine      Her/She

## 2023-11-11 ENCOUNTER — NON-APPOINTMENT (OUTPATIENT)
Age: 33
End: 2023-11-11

## 2023-12-16 ENCOUNTER — APPOINTMENT (OUTPATIENT)
Dept: OBGYN | Facility: CLINIC | Age: 33
End: 2023-12-16
Payer: MEDICAID

## 2023-12-16 VITALS
SYSTOLIC BLOOD PRESSURE: 112 MMHG | DIASTOLIC BLOOD PRESSURE: 70 MMHG | WEIGHT: 227 LBS | HEIGHT: 68 IN | BODY MASS INDEX: 34.4 KG/M2

## 2023-12-16 DIAGNOSIS — E66.9 OBESITY, UNSPECIFIED: ICD-10-CM

## 2023-12-16 DIAGNOSIS — N89.8 OTHER SPECIFIED NONINFLAMMATORY DISORDERS OF VAGINA: ICD-10-CM

## 2023-12-16 PROCEDURE — 99213 OFFICE O/P EST LOW 20 MIN: CPT

## 2023-12-16 NOTE — DISCUSSION/SUMMARY
[FreeTextEntry1] : PT aware pending culture results any further tx.  All the pt's questions/concerns were addressed.

## 2023-12-16 NOTE — PHYSICAL EXAM
[Appropriately responsive] : appropriately responsive [Alert] : alert [No Acute Distress] : no acute distress [Oriented x3] : oriented x3 [No Lesions] : no lesions  [Labia Majora] : normal [Labia Minora] : normal [Pink Rugae] : pink rugae [No Bleeding] : There was no active vaginal bleeding [Normal Position] : in a normal position [Normal] : normal [Uterine Adnexae] : non-palpable [FreeTextEntry4] : Small amount thick white discharge, and grayish discharge, cultures performed, vault irrigated with meduxated solution.

## 2023-12-16 NOTE — HISTORY OF PRESENT ILLNESS
[Y] : Patient is sexually active [N] : Patient denies prior pregnancies [Menarche Age: ____] : age at menarche was [unfilled] [No] : Patient does not have concerns regarding sex [Currently Active] : currently active [PapSmeardate] : 06/17/2023 [TextBox_31] : WNL  [TextBox_63] : NEG [GonorrheaDate] : 06/17/2023 [ChlamydiaDate] : 06/17/2023 [TextBox_68] : NEG  [TextBox_78] : NEG  [HPVDate] : 06/17/2023 [LMPDate] : 11/21/2023 [FreeTextEntry1] : 11/21/2023

## 2024-03-16 ENCOUNTER — APPOINTMENT (OUTPATIENT)
Dept: OBGYN | Facility: CLINIC | Age: 34
End: 2024-03-16
Payer: COMMERCIAL

## 2024-03-16 VITALS
SYSTOLIC BLOOD PRESSURE: 112 MMHG | DIASTOLIC BLOOD PRESSURE: 78 MMHG | WEIGHT: 224.25 LBS | BODY MASS INDEX: 33.99 KG/M2 | HEIGHT: 68 IN

## 2024-03-16 DIAGNOSIS — B96.89 ACUTE VAGINITIS: ICD-10-CM

## 2024-03-16 DIAGNOSIS — N76.0 ACUTE VAGINITIS: ICD-10-CM

## 2024-03-16 LAB
APPEARANCE: CLEAR
BILIRUBIN URINE: NEGATIVE
BLOOD URINE: ABNORMAL
COLOR: YELLOW
GLUCOSE QUALITATIVE U: NEGATIVE
KETONES URINE: NEGATIVE
LEUKOCYTE ESTERASE URINE: NEGATIVE
NITRITE URINE: NEGATIVE
PH URINE: 6
PROTEIN URINE: NEGATIVE
SPECIFIC GRAVITY URINE: 1.01
UROBILINOGEN URINE: 0.2 (ref 0.2–?)

## 2024-03-16 PROCEDURE — 99213 OFFICE O/P EST LOW 20 MIN: CPT

## 2024-03-16 RX ORDER — FLUCONAZOLE 150 MG/1
150 TABLET ORAL
Qty: 2 | Refills: 1 | Status: ACTIVE | COMMUNITY
Start: 2023-06-17 | End: 1900-01-01

## 2024-03-16 RX ORDER — METRONIDAZOLE 7.5 MG/G
0.75 GEL VAGINAL
Qty: 70 | Refills: 2 | Status: ACTIVE | COMMUNITY
Start: 2023-06-23 | End: 1900-01-01

## 2024-03-16 RX ORDER — TINIDAZOLE 500 MG/1
500 TABLET, FILM COATED ORAL DAILY
Qty: 8 | Refills: 0 | Status: DISCONTINUED | COMMUNITY
Start: 2023-12-20 | End: 2024-03-16

## 2024-03-16 RX ORDER — METRONIDAZOLE 500 MG/1
500 TABLET ORAL TWICE DAILY
Qty: 14 | Refills: 0 | Status: DISCONTINUED | COMMUNITY
Start: 2023-08-07 | End: 2024-03-16

## 2024-03-16 NOTE — COUNSELING
[Bladder Hygiene] : bladder hygiene [Vitamins/Supplements] : vitamins/supplements [STD (testing, results, tx)] : STD (testing, results, tx)

## 2024-03-16 NOTE — HISTORY OF PRESENT ILLNESS
[Patient reported PAP Smear was normal] : Patient reported PAP Smear was normal [N] : Patient denies prior pregnancies [Y] : Patient is sexually active [Condoms] : uses condoms [Menarche Age: ____] : age at menarche was [unfilled] [Currently Active] : currently active [No] : Patient does not have concerns regarding sex [Men] : men [PapSmeardate] : 06/17/2023 [GonorrheaDate] : 06/17/2023 [TextBox_63] : NEG [ChlamydiaDate] : 06/17/2023 [HPVDate] : 06/17/2023 [TextBox_68] : NEG [LMPDate] : 02/27/2024 [TextBox_78] : NEG [PGHxTotal] : 0 [FreeTextEntry1] : 02/27/2024
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
## 2024-03-23 LAB
CANDIDA VAG CYTO: NOT DETECTED
G VAGINALIS+PREV SP MTYP VAG QL MICRO: DETECTED
GP B STREP DNA SPEC QL NAA+PROBE: NOT DETECTED
SOURCE GBS: NORMAL
T VAGINALIS VAG QL WET PREP: NOT DETECTED

## 2024-04-03 ENCOUNTER — EMERGENCY (EMERGENCY)
Facility: HOSPITAL | Age: 34
LOS: 0 days | Discharge: HOME | End: 2024-04-03
Attending: EMERGENCY MEDICINE
Payer: COMMERCIAL

## 2024-04-03 VITALS
HEIGHT: 68 IN | TEMPERATURE: 98 F | DIASTOLIC BLOOD PRESSURE: 84 MMHG | WEIGHT: 220.02 LBS | OXYGEN SATURATION: 100 % | RESPIRATION RATE: 18 BRPM | SYSTOLIC BLOOD PRESSURE: 117 MMHG | HEART RATE: 75 BPM

## 2024-04-03 DIAGNOSIS — R51.9 HEADACHE, UNSPECIFIED: ICD-10-CM
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
## 2024-04-03 DIAGNOSIS — S16.1XXA STRAIN OF MUSCLE, FASCIA AND TENDON AT NECK LEVEL, INITIAL ENCOUNTER: ICD-10-CM

## 2024-04-03 DIAGNOSIS — M54.2 CERVICALGIA: ICD-10-CM
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
## 2024-04-03 PROCEDURE — 99284 EMERGENCY DEPT VISIT MOD MDM: CPT

## 2024-04-03 PROCEDURE — 99283 EMERGENCY DEPT VISIT LOW MDM: CPT

## 2024-04-03 RX ORDER — IBUPROFEN 200 MG
600 TABLET ORAL ONCE
Refills: 0 | Status: COMPLETED | OUTPATIENT
Start: 2024-04-03 | End: 2024-04-03

## 2024-04-03 RX ORDER — ACETAMINOPHEN 500 MG
975 TABLET ORAL ONCE
Refills: 0 | Status: COMPLETED | OUTPATIENT
Start: 2024-04-03 | End: 2024-04-03

## 2024-04-03 RX ADMIN — Medication 975 MILLIGRAM(S): at 09:21

## 2024-04-03 RX ADMIN — Medication 600 MILLIGRAM(S): at 09:21

## 2024-04-03 NOTE — ED PROVIDER NOTE - PATIENT PORTAL LINK FT
You can access the FollowMyHealth Patient Portal offered by [facility] by registering at the following website: [email or web address]. By joining [employer]’s FollowMyHealth portal, you will also be able to view your health information using other applications (apps) compatible with our system.
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
## 2024-05-15 LAB
A VAGINAE DNA VAG QL NAA+PROBE: NORMAL
BVAB2 DNA VAG QL NAA+PROBE: ABNORMAL
C KRUSEI DNA VAG QL NAA+PROBE: NEGATIVE
C TRACH RRNA SPEC QL NAA+PROBE: NEGATIVE
CANDIDA DNA VAG QL NAA+PROBE: NEGATIVE
MEGA1 DNA VAG QL NAA+PROBE: NORMAL
N GONORRHOEA RRNA SPEC QL NAA+PROBE: NEGATIVE
T VAGINALIS RRNA SPEC QL NAA+PROBE: NEGATIVE

## 2024-12-25 PROBLEM — F10.90 ALCOHOL USE: Status: ACTIVE | Noted: 2019-02-19

## 2025-06-18 ENCOUNTER — APPOINTMENT (OUTPATIENT)
Dept: OBGYN | Facility: CLINIC | Age: 35
End: 2025-06-18

## 2025-06-18 VITALS
WEIGHT: 224 LBS | SYSTOLIC BLOOD PRESSURE: 114 MMHG | DIASTOLIC BLOOD PRESSURE: 70 MMHG | BODY MASS INDEX: 33.95 KG/M2 | HEIGHT: 68 IN

## 2025-06-18 PROCEDURE — 99213 OFFICE O/P EST LOW 20 MIN: CPT

## 2025-06-18 RX ORDER — FLUCONAZOLE 150 MG/1
150 TABLET ORAL
Qty: 1 | Refills: 2 | Status: ACTIVE | COMMUNITY
Start: 2025-06-18 | End: 1900-01-01

## 2025-06-20 LAB
APPEARANCE: CLEAR
BILIRUBIN URINE: NEGATIVE
BLOOD URINE: ABNORMAL
COLOR: YELLOW
GLUCOSE QUALITATIVE U: NEGATIVE
KETONES URINE: NEGATIVE
LEUKOCYTE ESTERASE URINE: NEGATIVE
NITRITE URINE: NEGATIVE
PH URINE: 5.5
PROTEIN URINE: NEGATIVE
SPECIFIC GRAVITY URINE: <=1.005
UROBILINOGEN URINE: 0.2 (ref 0.2–?)

## 2025-06-27 LAB
A VAGINAE DNA VAG QL NAA+PROBE: NORMAL
BACTERIA UR CULT: NORMAL
BVAB2 DNA VAG QL NAA+PROBE: NORMAL
C KRUSEI DNA VAG QL NAA+PROBE: NEGATIVE
C TRACH RRNA SPEC QL NAA+PROBE: NEGATIVE
CANDIDA DNA VAG QL NAA+PROBE: NEGATIVE
MEGA1 DNA VAG QL NAA+PROBE: NORMAL
N GONORRHOEA RRNA SPEC QL NAA+PROBE: NEGATIVE
T VAGINALIS RRNA SPEC QL NAA+PROBE: NEGATIVE